# Patient Record
Sex: FEMALE | Race: WHITE | Employment: STUDENT | URBAN - METROPOLITAN AREA
[De-identification: names, ages, dates, MRNs, and addresses within clinical notes are randomized per-mention and may not be internally consistent; named-entity substitution may affect disease eponyms.]

---
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
## 2024-05-21 ENCOUNTER — EMPLOYEE HEALTH (OUTPATIENT)
Dept: OTHER | Facility: HOSPITAL | Age: 19
End: 2024-05-21
Attending: PREVENTIVE MEDICINE

## 2024-05-21 DIAGNOSIS — Z11.1 SCREENING-PULMONARY TB: Primary | ICD-10-CM

## 2024-05-21 PROCEDURE — 86480 TB TEST CELL IMMUN MEASURE: CPT

## 2024-05-23 LAB
M TB IFN-G CD4+ T-CELLS BLD-ACNC: 0.04 IU/ML
M TB TUBERC IFN-G BLD QL: NEGATIVE
M TB TUBERC IGNF/MITOGEN IGNF CONTROL: >10 IU/ML
QFT TB1 AG MINUS NIL: 0 IU/ML
QFT TB2 AG MINUS NIL: 0 IU/ML

## (undated) NOTE — LETTER
State of [facility] [employer] of Child Health Examination       Student's Name  Ashley Enciso Birth Hemant Signature                                                                                                                                     Title           DO, [facility]                Date  7/10/19   Signature 1/26/2005  Sex  Female School   Grade Level/ID#  10th Grade   HEALTH HISTORY          TO BE COMPLETED AND SIGNED BY PARENT/GUARDIAN AND VERIFIED BY HEALTH CARE PROVIDER    ALLERGIES  (Food, drug, insect, other)  Seasonal MEDICATION  (List all prescribed or adult)   Pulse 78   Temp 98.3 °F (36.8 °C) (Oral)   Resp 15   Ht 69.5\"   Wt 133 lb   LMP 04/10/2019   BMI 19.36 kg/m²     DIABETES SCREENING  BMI>85% age/sex  No And any two of the following:  Family History No    Ethnic Minority  No          Signs of Ins Respiratory Yes                   Diagnosis of Asthma: No Mental Health Yes        Currently Prescribed Asthma Medication:            Quick-relief  medication (e.g. Short Acting Beta Antagonist): No          Controller medication (e.g. inhaled corticostero

## (undated) NOTE — ED AVS SNAPSHOT
Michael Lee   MRN: [id number]    Department:  [facility] Emergency Department   Date of Visit:  3/31/2019           Disclosure     Insurance plans vary and the physician(s) referred by the ER may not be covered by your plan.  Please contact your tell this physician (or your personal doctor if your instructions are to return to your personal doctor) about any new or lasting problems. The primary care or specialist physician will see patients referred from the [facility] Emergency Department.  Dia Clark

## (undated) NOTE — ED AVS SNAPSHOT
Enrique Stein   MRN: [id number]    Department:  [facility] Emergency Department   Date of Visit:  4/4/2019           Disclosure     Insurance plans vary and the physician(s) referred by the ER may not be covered by your plan.  Please contact your i tell this physician (or your personal doctor if your instructions are to return to your personal doctor) about any new or lasting problems. The primary care or specialist physician will see patients referred from the [facility] Emergency Department.  Claudio Martinez

## (undated) NOTE — ED AVS SNAPSHOT
Shon Aponte   MRN: [id number]    Department:  [facility] Emergency Department   Date of Visit:  8/3/2019           Disclosure     Insurance plans vary and the physician(s) referred by the ER may not be covered by your plan.  Please contact your i tell this physician (or your personal doctor if your instructions are to return to your personal doctor) about any new or lasting problems. The primary care or specialist physician will see patients referred from the [facility] Emergency Department.  Jere Aguirre

## (undated) NOTE — LETTER
Name:  Faby Wendy Year:  10th Grade Class: Student ID No.:   Address:  [address] [place] [address] Phone:  [phone] (home) [phone] (work) :  12year old   Name Relationship [address] Work Phone Home Phone Mobile Phone syndrome, arrhythmogenic right ventricular cardiomyopathy, long QT syndrome, short QT syndrome, Brugada syndrome, or catecholaminergic polymorphic ventricular tachycardia? No   15.  Does anyone in your family have a heart problem, pacemaker, or implanted de 35. Have you ever had a hit or blow to the head that caused confusion, prolonged headache, or memory problems? No   36. Do you have a history of seizure disorder? No   37. Do you have headaches with exercise? No   38.  Have you ever had numbness, tingling, Wt 133 lb   LMP 04/10/2019   BMI 19.36 kg/m²  39 %ile (Z= -0.28) based on CDC (Girls, 2-20 Years) BMI-for-age based on BMI available as of 7/10/2019. female    Vision: [address] physicals.    [facility] Substance Testing Policy Consent to Random Testing   (This section for high school students only)   0538-6362 school term    As a prerequisite to participation in "[employer]tic activities, we agree that I/our student will not use performa

## (undated) NOTE — LETTER
Name:  Josh Moore Year:  8th Grade Class: Student ID No.:   Address:  [address] [place], [place] [id number] Phone:  [phone] (home)  :  14year old   Name Relationship [address] Work Phone Home Phone Mobile Phone   1.  Samaria Peterson* syndrome, arrhythmogenic right ventricular cardiomyopathy, long QT syndrome, short QT syndrome, Brugada syndrome, or catecholaminergic polymorphic ventricular tachycardia? No   15.  Does anyone in your family have a heart problem, pacemaker, or implanted de 35. Have you ever had a hit or blow to the head that caused confusion, prolonged headache, or memory problems? No   36. Do you have a history of seizure disorder? No   37. Do you have headaches with exercise? No   38.  Have you ever had numbness, tingling, /70 (BP Location: Right arm, Patient Position: Sitting, Cuff Size: adult)   Pulse 89   Temp 98.2 °F (36.8 °C) (Oral)   Resp 16   Ht 65.5\"   Wt 124 lb   LMP 07/24/2017   SpO2 99%   BMI 20.32 kg/m²  65 %ile (Z= 0.40) based on CDC 2-20 Years BMI-for-ag *effective January 2003, the [employer] of Directors approved a recommendation, consistent with the [facility] [employer], that allows [employer] or Advanced Nurse Practitioners to sign off on physicals.    [facility] Substance Testing Policy Consent t Medicine, & [address] [place] [address] [employer]. Permission granted to reprint for noncommercial, educational purposes with acknowledgment.    [id number]

## (undated) NOTE — LETTER
[facility] [employer] of Child Health Examination       Student's Name  Erick Khan Birth Date Title                           Date  [birth year]   Signature HEALTH HISTORY          TO BE COMPLETED AND SIGNED BY PARENT/GUARDIAN AND VERIFIED BY HEALTH CARE PROVIDER    ALLERGIES  (Food, drug, insect, other)  Seasonal MEDICATION  (List all prescribed or taken on a regular basis.)    Current Outpatient Prescription BP 90/50 (BP Location: Left arm, Patient Position: Sitting, Cuff Size: adult)   Pulse 88   Temp 98 °F (36.7 °C) (Oral)   Resp 16   Ht 68.5\"   Wt 132 lb   LMP 01/12/2018   BMI 19.78 kg/m²     DIABETES SCREENING  BMI>85% age/sex   No  And any two of the fol Cardiovascular/HTN Yes  Nutritional status Yes    Respiratory Yes                   Diagnosis of Asthma: No Mental Health Yes        Currently Prescribed Asthma Medication:            Quick-relief  medication (e.g. Short Acting Beta Antagonist):  No

## (undated) NOTE — LETTER
Name:  Jameson Timothy Year:   Class: Student ID No.:   Address:  [address] [place] [address] Phone:  [phone] (home)  : [age]year old   Name Relationship [address] Work Phone Home Phone Mobile Phone   1.  Chantal Walters* Mother 15. Does anyone in your family have hypertrophic cardiomyopathy, Marfan syndrome, arrhythmogenic right ventricular cardiomyopathy, long QT syndrome, short QT syndrome, Brugada syndrome, or catecholaminergic polymorphic ventricular tachycardia? no   15.  [place] 34. Have you ever had a head injury or concussion? no   35. Have you ever had a hit or blow to the head that caused confusion, prolonged headache, or memory problems? no   36. Do you have a history of seizure disorder? no   37.  Do you have headaches with e Wt 132 lb   LMP 01/12/2018   BMI 19.78 kg/m²  53 %ile (Z= 0.08) based on CDC 2-20 Years BMI-for-age data using vitals from 6/12/2018. female    Vision: R            L            BOTH                MEDICAL NORMAL ABNORMAL FINDINGS   Appearance:  [address] [facility] Substance Testing Policy Consent to Random Testing   (This section for high school students only)   9423-9733 school term    As a prerequisite to participation in [employer] activities, we agree that I/our student will not use performance-enhancing